# Patient Record
Sex: MALE | Race: AMERICAN INDIAN OR ALASKA NATIVE | URBAN - METROPOLITAN AREA
[De-identification: names, ages, dates, MRNs, and addresses within clinical notes are randomized per-mention and may not be internally consistent; named-entity substitution may affect disease eponyms.]

---

## 2021-06-25 ENCOUNTER — OFFICE VISIT (OUTPATIENT)
Dept: URBAN - METROPOLITAN AREA CLINIC 94 | Facility: CLINIC | Age: 51
End: 2021-06-25
Payer: COMMERCIAL

## 2021-06-25 ENCOUNTER — DASHBOARD ENCOUNTERS (OUTPATIENT)
Age: 51
End: 2021-06-25

## 2021-06-25 ENCOUNTER — WEB ENCOUNTER (OUTPATIENT)
Dept: URBAN - METROPOLITAN AREA CLINIC 94 | Facility: CLINIC | Age: 51
End: 2021-06-25

## 2021-06-25 VITALS
BODY MASS INDEX: 26.07 KG/M2 | WEIGHT: 176 LBS | HEART RATE: 61 BPM | DIASTOLIC BLOOD PRESSURE: 69 MMHG | HEIGHT: 69 IN | TEMPERATURE: 98.1 F | SYSTOLIC BLOOD PRESSURE: 102 MMHG

## 2021-06-25 DIAGNOSIS — R10.9 ABDOMINAL CRAMPING: ICD-10-CM

## 2021-06-25 DIAGNOSIS — R19.7 DIARRHEA OF PRESUMED INFECTIOUS ORIGIN: ICD-10-CM

## 2021-06-25 PROCEDURE — 99203 OFFICE O/P NEW LOW 30 MIN: CPT | Performed by: INTERNAL MEDICINE

## 2021-06-25 RX ORDER — METHOCARBAMOL TABLETS 500 MG/1
TAKE ONE TABLET BY MOUTH TWICE A DAY FOR 10 DAYS TABLET, COATED ORAL
Qty: 20 | Refills: 0 | Status: ON HOLD | COMMUNITY

## 2021-06-25 RX ORDER — NEOMYCIN AND POLYMYXIN B SULFATES AND DEXAMETHASONE 3.5; 10000; 1 MG/G; [IU]/G; MG/G
APPLY TO BOTH EYES FOUR TIMES DAILY FOR 10 DAYS THEN DISCONTINUE OINTMENT OPHTHALMIC
Qty: 3.5 | Refills: 0 | Status: ON HOLD | COMMUNITY

## 2021-06-25 RX ORDER — IBUPROFEN 800 MG/1
TAKE ONE TABLET BY MOUTH THREE TIMES A DAY AS NEEDED FOR PAIN TAKE WITH FOOD TABLET, FILM COATED ORAL
Qty: 21 | Refills: 0 | Status: ON HOLD | COMMUNITY

## 2021-06-25 RX ORDER — MELOXICAM 15 MG/1
TAKE ONE TABLET BY MOUTH ONE TIME DAILY TABLET ORAL
Qty: 30 | Refills: 0 | Status: ON HOLD | COMMUNITY

## 2021-06-25 RX ORDER — TRAMADOL HYDROCHLORIDE 50 MG/1
TAKE ONE TABLET BY MOUTH EVERY 6 HOURS AS NEEDED FOR PAIN TABLET, FILM COATED ORAL
Qty: 12 | Refills: 0 | Status: ON HOLD | COMMUNITY

## 2021-06-25 NOTE — HPI-TODAY'S VISIT:
52 y/o M here with diarrhea  Patient reports he was in [place] over the weekend, and had food outside at a restaurant, and the very next day started having loose non-bloody watery diarrhea along with abdominal cramping. Mild nausea, but no vomiting. Yesterday and today the diarrhea and cramping has started to improve, though still present  Denies any GI bleeding, family hx of GI malignancy/IBD  No prior colonoscopy but does report having a cologuard test done a few months ago which was negative Abdomen soft, non-tender and non-distended, no rebound, no guarding and no masses. no hepatosplenomegaly.

## 2021-06-30 ENCOUNTER — LAB OUTSIDE AN ENCOUNTER (OUTPATIENT)
Dept: URBAN - METROPOLITAN AREA CLINIC 94 | Facility: CLINIC | Age: 51
End: 2021-06-30

## 2021-07-05 LAB — GASTROINTESTINAL PATHOGEN: (no result)

## 2021-08-13 ENCOUNTER — OFFICE VISIT (OUTPATIENT)
Dept: URBAN - METROPOLITAN AREA CLINIC 94 | Facility: CLINIC | Age: 51
End: 2021-08-13

## 2021-08-20 ENCOUNTER — OFFICE VISIT (OUTPATIENT)
Dept: URBAN - METROPOLITAN AREA CLINIC 94 | Facility: CLINIC | Age: 51
End: 2021-08-20